# Patient Record
Sex: FEMALE | Race: BLACK OR AFRICAN AMERICAN | NOT HISPANIC OR LATINO | Employment: PART TIME | URBAN - METROPOLITAN AREA
[De-identification: names, ages, dates, MRNs, and addresses within clinical notes are randomized per-mention and may not be internally consistent; named-entity substitution may affect disease eponyms.]

---

## 2017-02-20 ENCOUNTER — SEE NOTE (OUTPATIENT)
Dept: URBAN - METROPOLITAN AREA CLINIC 36 | Facility: CLINIC | Age: 32
Setting detail: DERMATOLOGY
End: 2017-02-20

## 2017-02-20 PROCEDURE — 99213 OFFICE O/P EST LOW 20 MIN: CPT

## 2017-02-20 PROCEDURE — 87101 SKIN FUNGI CULTURE: CPT

## 2017-02-20 RX ORDER — HYDROQUINONE 40 MG/G
1 APPLICATION CREAM TOPICAL BID
Qty: 46 | Refills: 6 | Status: DISCONTINUED
Start: 2017-02-20 | End: 2018-02-05

## 2017-02-20 RX ORDER — FLUOCINOLONE ACETONIDE 0.11 MG/ML
1 ML OIL TOPICAL NIGHTLY
Qty: 118.28 | Refills: 3 | Status: DISCONTINUED
Start: 2017-02-20 | End: 2018-12-17

## 2017-02-20 RX ORDER — DAPSONE 75 MG/G
GEL TOPICAL
Qty: 60 | Refills: 6 | Status: DISCONTINUED
Start: 2017-02-20 | End: 2017-11-06

## 2017-02-21 ENCOUNTER — RX ONLY (RX ONLY)
Age: 32
End: 2017-02-21

## 2017-02-21 RX ORDER — AZELAIC ACID 0.15 G/G
1 APPLICATION GEL TOPICAL DAILY
Qty: 50 | Refills: 0 | Status: DISCONTINUED
Start: 2017-02-21 | End: 2018-04-09

## 2017-02-22 ENCOUNTER — RX ONLY (RX ONLY)
Age: 32
End: 2017-02-22

## 2017-02-22 RX ORDER — AZELAIC ACID 0.15 G/G
1 GM AEROSOL, FOAM TOPICAL DAILY
Qty: 50 | Refills: 0 | Status: DISCONTINUED
Start: 2017-02-22 | End: 2018-12-17

## 2017-11-06 ENCOUNTER — ACNE/ROSACEA (OUTPATIENT)
Dept: URBAN - METROPOLITAN AREA CLINIC 36 | Facility: CLINIC | Age: 32
Setting detail: DERMATOLOGY
End: 2017-11-06

## 2017-11-06 PROCEDURE — 99213 OFFICE O/P EST LOW 20 MIN: CPT

## 2017-11-06 RX ORDER — SPIRONOLACTONE 50 MG/1
1 TABLET TABLET, FILM COATED ORAL TID
Qty: 90 | Refills: 2 | Status: DISCONTINUED
Start: 2017-11-06 | End: 2018-12-17

## 2017-11-06 RX ORDER — AZELAIC ACID 0.15 G/G
1 GM AEROSOL, FOAM TOPICAL DAILY
Qty: 50 | Refills: 5 | Status: DISCONTINUED
Start: 2017-11-06 | End: 2018-04-09

## 2017-11-06 RX ORDER — DAPSONE 75 MG/G
1 GM GEL TOPICAL DAILY
Qty: 60 | Refills: 6 | Status: DISCONTINUED
Start: 2017-11-06 | End: 2018-12-17

## 2018-02-05 ENCOUNTER — FOLLOW UP (OUTPATIENT)
Dept: URBAN - METROPOLITAN AREA CLINIC 36 | Facility: CLINIC | Age: 33
Setting detail: DERMATOLOGY
End: 2018-02-05

## 2018-02-05 PROCEDURE — 99213 OFFICE O/P EST LOW 20 MIN: CPT

## 2018-04-09 ENCOUNTER — FOLLOW UP (OUTPATIENT)
Dept: URBAN - METROPOLITAN AREA CLINIC 36 | Facility: CLINIC | Age: 33
Setting detail: DERMATOLOGY
End: 2018-04-09

## 2018-04-09 PROCEDURE — 99213 OFFICE O/P EST LOW 20 MIN: CPT

## 2018-12-06 ENCOUNTER — RX ONLY (RX ONLY)
Age: 33
End: 2018-12-06

## 2018-12-06 RX ORDER — SPIRONOLACTONE 50 MG/1
ADD'L SIG ADD'L SIG TABLET, FILM COATED ORAL ADD'L SIG
Qty: 90 | Refills: 2 | Status: DISCONTINUED
Start: 2018-12-06 | End: 2018-12-17

## 2018-12-17 ENCOUNTER — ACNE/ROSACEA (OUTPATIENT)
Dept: URBAN - METROPOLITAN AREA CLINIC 36 | Facility: CLINIC | Age: 33
Setting detail: DERMATOLOGY
End: 2018-12-17

## 2018-12-17 PROCEDURE — 99213 OFFICE O/P EST LOW 20 MIN: CPT

## 2018-12-17 RX ORDER — DAPSONE 75 MG/G
1 GM GEL TOPICAL DAILY
Qty: 60 | Refills: 6 | Status: DISCONTINUED
Start: 2018-12-17 | End: 2019-12-27

## 2018-12-17 RX ORDER — SPIRONOLACTONE 50 MG/1
ADD'L SIG ADD'L SIG TABLET, FILM COATED ORAL ADD'L SIG
Qty: 90 | Refills: 2 | Status: DISCONTINUED
Start: 2018-12-17 | End: 2019-06-26

## 2018-12-17 RX ORDER — CLOBETASOL PROPIONATE 0.5 MG/G
1 APPLICATION AEROSOL, FOAM TOPICAL DAILY
Qty: 100 | Refills: 5 | Status: DISCONTINUED
Start: 2018-12-17 | End: 2019-12-27

## 2018-12-17 RX ORDER — CICLOPIROX 1 G/100ML
1 APPLICATION SHAMPOO TOPICAL 2 TIMES WEEKLY
Qty: 120 | Refills: 6 | Status: DISCONTINUED
Start: 2018-12-17 | End: 2019-12-27

## 2019-03-25 ENCOUNTER — ACNE/ROSACEA (OUTPATIENT)
Dept: URBAN - METROPOLITAN AREA CLINIC 36 | Facility: CLINIC | Age: 34
Setting detail: DERMATOLOGY
End: 2019-03-25

## 2019-03-25 DIAGNOSIS — D48.5 NEOPLASM OF UNCERTAIN BEHAVIOR OF SKIN: ICD-10-CM

## 2019-03-25 PROCEDURE — 99213 OFFICE O/P EST LOW 20 MIN: CPT

## 2019-06-26 ENCOUNTER — RX ONLY (RX ONLY)
Age: 34
End: 2019-06-26

## 2019-06-26 RX ORDER — SPIRONOLACTONE 50 MG/1
ADD'L SIG ADD'L SIG TABLET, FILM COATED ORAL ADD'L SIG
Qty: 90 | Refills: 2
Start: 2019-06-26

## 2019-12-27 ENCOUNTER — ACNE/ROSACEA (OUTPATIENT)
Dept: URBAN - METROPOLITAN AREA CLINIC 36 | Facility: CLINIC | Age: 34
Setting detail: DERMATOLOGY
End: 2019-12-27

## 2019-12-27 DIAGNOSIS — Z48.02 ENCOUNTER FOR REMOVAL OF SUTURES: ICD-10-CM

## 2019-12-27 PROCEDURE — 99213 OFFICE O/P EST LOW 20 MIN: CPT

## 2020-06-19 ENCOUNTER — SEE NOTE (OUTPATIENT)
Dept: URBAN - METROPOLITAN AREA CLINIC 36 | Facility: CLINIC | Age: 35
Setting detail: DERMATOLOGY
End: 2020-06-19

## 2020-06-19 DIAGNOSIS — B07.8 OTHER VIRAL WARTS: ICD-10-CM

## 2020-06-19 PROCEDURE — 99213 OFFICE O/P EST LOW 20 MIN: CPT

## 2020-06-22 ENCOUNTER — RX ONLY (RX ONLY)
Age: 35
End: 2020-06-22

## 2020-06-22 RX ORDER — CLINDAMYCIN PHOSPHATE AND TRETINOIN 12; .25 MG/G; MG/G
1 APPLICATION GEL TOPICAL NIGHTLY
Qty: 30 | Refills: 2
Start: 2020-06-22

## 2020-06-22 RX ORDER — DOXYCYCLINE HYCLATE 100 MG/1
1 CAPSULE CAPSULE, GELATIN COATED ORAL DAILY
Qty: 30 | Refills: 1
Start: 2020-06-22

## 2020-06-22 RX ORDER — AZELAIC ACID 0.15 G/G
1 APPLICATION GEL TOPICAL QHS
Qty: 50 | Refills: 3
Start: 2020-06-22

## 2020-06-22 RX ORDER — NYSTATIN AND TRIAMCINOLONE ACETONIDE 100000; 1 [USP'U]/G; MG/G
1 APPLICATION CREAM TOPICAL BID
Qty: 60 | Refills: 0
Start: 2020-06-22

## 2021-02-05 ENCOUNTER — APPOINTMENT (RX ONLY)
Dept: URBAN - METROPOLITAN AREA OTHER 5 | Facility: OTHER | Age: 36
Setting detail: DERMATOLOGY
End: 2021-02-05

## 2021-02-05 DIAGNOSIS — L81.0 POSTINFLAMMATORY HYPERPIGMENTATION: ICD-10-CM

## 2021-02-05 DIAGNOSIS — L70.0 ACNE VULGARIS: ICD-10-CM | Status: INADEQUATELY CONTROLLED

## 2021-02-05 PROCEDURE — 99204 OFFICE O/P NEW MOD 45 MIN: CPT

## 2021-02-05 PROCEDURE — ? TREATMENT REGIMEN

## 2021-02-05 PROCEDURE — ? ORDER TESTS

## 2021-02-05 PROCEDURE — ? COUNSELING

## 2021-02-05 ASSESSMENT — LOCATION SIMPLE DESCRIPTION DERM
LOCATION SIMPLE: RIGHT CHEEK
LOCATION SIMPLE: INFERIOR FOREHEAD

## 2021-02-05 ASSESSMENT — LOCATION DETAILED DESCRIPTION DERM
LOCATION DETAILED: INFERIOR MID FOREHEAD
LOCATION DETAILED: RIGHT INFERIOR CENTRAL MALAR CHEEK

## 2021-02-05 ASSESSMENT — LOCATION ZONE DERM: LOCATION ZONE: FACE

## 2021-02-05 NOTE — PROCEDURE: TREATMENT REGIMEN
Continue Regimen: Veltin at night
Initiate Treatment: Ponds for sensitive skin
Detail Level: Simple
Otc Regimen: Vitex berry, niacinamide, zinc\\nThe ordinary azelaic acid suspension\\nEminence probiotic face wash
Initiate Treatment: Skinceuticals Discoloration defense \\nISDIN pigment expert ampules \\nSkinmedica lytera \\n\\Larisa of these in place of Hydroquinone
Discontinue Regimen: Stop hydroquinone for now

## 2021-02-05 NOTE — PROCEDURE: COUNSELING
High Dose Vitamin A Counseling: Side effects reviewed, pt to contact office should one occur.
Benzoyl Peroxide Counseling: Patient counseled that medicine may cause skin irritation and bleach clothing.  In the event of skin irritation, the patient was advised to reduce the amount of the drug applied or use it less frequently.   The patient verbalized understanding of the proper use and possible adverse effects of benzoyl peroxide.  All of the patient's questions and concerns were addressed.
Dapsone Counseling: I discussed with the patient the risks of dapsone including but not limited to hemolytic anemia, agranulocytosis, rashes, methemoglobinemia, kidney failure, peripheral neuropathy, headaches, GI upset, and liver toxicity.  Patients who start dapsone require monitoring including baseline LFTs and weekly CBCs for the first month, then every month thereafter.  The patient verbalized understanding of the proper use and possible adverse effects of dapsone.  All of the patient's questions and concerns were addressed.
Erythromycin Counseling:  I discussed with the patient the risks of erythromycin including but not limited to GI upset, allergic reaction, drug rash, diarrhea, increase in liver enzymes, and yeast infections.
Tazorac Counseling:  Patient advised that medication is irritating and drying.  Patient may need to apply sparingly and wash off after an hour before eventually leaving it on overnight.  The patient verbalized understanding of the proper use and possible adverse effects of tazorac.  All of the patient's questions and concerns were addressed.
Bactrim Pregnancy And Lactation Text: This medication is Pregnancy Category D and is known to cause fetal risk.  It is also excreted in breast milk.
Spironolactone Pregnancy And Lactation Text: This medication can cause feminization of the male fetus and should be avoided during pregnancy. The active metabolite is also found in breast milk.
Dapsone Pregnancy And Lactation Text: This medication is Pregnancy Category C and is not considered safe during pregnancy or breast feeding.
Minocycline Pregnancy And Lactation Text: This medication is Pregnancy Category D and not consider safe during pregnancy. It is also excreted in breast milk.
Benzoyl Peroxide Pregnancy And Lactation Text: This medication is Pregnancy Category C. It is unknown if benzoyl peroxide is excreted in breast milk.
Tazorac Pregnancy And Lactation Text: This medication is not safe during pregnancy. It is unknown if this medication is excreted in breast milk.
Erythromycin Pregnancy And Lactation Text: This medication is Pregnancy Category B and is considered safe during pregnancy. It is also excreted in breast milk.
Topical Sulfur Applications Counseling: Topical Sulfur Counseling: Patient counseled that this medication may cause skin irritation or allergic reactions.  In the event of skin irritation, the patient was advised to reduce the amount of the drug applied or use it less frequently.   The patient verbalized understanding of the proper use and possible adverse effects of topical sulfur application.  All of the patient's questions and concerns were addressed.
Azithromycin Counseling:  I discussed with the patient the risks of azithromycin including but not limited to GI upset, allergic reaction, drug rash, diarrhea, and yeast infections.
Detail Level: Zone
Sarecycline Counseling: Patient advised regarding possible photosensitivity and discoloration of the teeth, skin, lips, tongue and gums.  Patient instructed to avoid sunlight, if possible.  When exposed to sunlight, patients should wear protective clothing, sunglasses, and sunscreen.  The patient was instructed to call the office immediately if the following severe adverse effects occur:  hearing changes, easy bruising/bleeding, severe headache, or vision changes.  The patient verbalized understanding of the proper use and possible adverse effects of sarecycline.  All of the patient's questions and concerns were addressed.
Birth Control Pills Counseling: Birth Control Pill Counseling: I discussed with the patient the potential side effects of OCPs including but not limited to increased risk of stroke, heart attack, thrombophlebitis, deep venous thrombosis, hepatic adenomas, breast changes, GI upset, headaches, and depression.  The patient verbalized understanding of the proper use and possible adverse effects of OCPs. All of the patient's questions and concerns were addressed.
Azithromycin Pregnancy And Lactation Text: This medication is considered safe during pregnancy and is also secreted in breast milk.
Use Enhanced Medication Counseling?: No
Tetracycline Counseling: Patient counseled regarding possible photosensitivity and increased risk for sunburn.  Patient instructed to avoid sunlight, if possible.  When exposed to sunlight, patients should wear protective clothing, sunglasses, and sunscreen.  The patient was instructed to call the office immediately if the following severe adverse effects occur:  hearing changes, easy bruising/bleeding, severe headache, or vision changes.  The patient verbalized understanding of the proper use and possible adverse effects of tetracycline.  All of the patient's questions and concerns were addressed. Patient understands to avoid pregnancy while on therapy due to potential birth defects.
Doxycycline Counseling:  Patient counseled regarding possible photosensitivity and increased risk for sunburn.  Patient instructed to avoid sunlight, if possible.  When exposed to sunlight, patients should wear protective clothing, sunglasses, and sunscreen.  The patient was instructed to call the office immediately if the following severe adverse effects occur:  hearing changes, easy bruising/bleeding, severe headache, or vision changes.  The patient verbalized understanding of the proper use and possible adverse effects of doxycycline.  All of the patient's questions and concerns were addressed.
Topical Retinoid counseling:  Patient advised to apply a pea-sized amount only at bedtime and wait 30 minutes after washing their face before applying.  If too drying, patient may add a non-comedogenic moisturizer. The patient verbalized understanding of the proper use and possible adverse effects of retinoids.  All of the patient's questions and concerns were addressed.
Isotretinoin Counseling: Patient should get monthly blood tests, not donate blood, not drive at night if vision affected, not share medication, and not undergo elective surgery for 6 months after tx completed. Side effects reviewed, pt to contact office should one occur.
Birth Control Pills Pregnancy And Lactation Text: This medication should be avoided if pregnant and for the first 30 days post-partum.
Doxycycline Pregnancy And Lactation Text: This medication is Pregnancy Category D and not consider safe during pregnancy. It is also excreted in breast milk but is considered safe for shorter treatment courses.
Topical Clindamycin Counseling: Patient counseled that this medication may cause skin irritation or allergic reactions.  In the event of skin irritation, the patient was advised to reduce the amount of the drug applied or use it less frequently.   The patient verbalized understanding of the proper use and possible adverse effects of clindamycin.  All of the patient's questions and concerns were addressed.
High Dose Vitamin A Pregnancy And Lactation Text: High dose vitamin A therapy is contraindicated during pregnancy and breast feeding.
Topical Sulfur Applications Pregnancy And Lactation Text: This medication is Pregnancy Category C and has an unknown safety profile during pregnancy. It is unknown if this topical medication is excreted in breast milk.
Topical Retinoid Pregnancy And Lactation Text: This medication is Pregnancy Category C. It is unknown if this medication is excreted in breast milk.
Isotretinoin Pregnancy And Lactation Text: This medication is Pregnancy Category X and is considered extremely dangerous during pregnancy. It is unknown if it is excreted in breast milk.
Topical Clindamycin Pregnancy And Lactation Text: This medication is Pregnancy Category B and is considered safe during pregnancy. It is unknown if it is excreted in breast milk.
Minocycline Counseling: Patient advised regarding possible photosensitivity and discoloration of the teeth, skin, lips, tongue and gums.  Patient instructed to avoid sunlight, if possible.  When exposed to sunlight, patients should wear protective clothing, sunglasses, and sunscreen.  The patient was instructed to call the office immediately if the following severe adverse effects occur:  hearing changes, easy bruising/bleeding, severe headache, or vision changes.  The patient verbalized understanding of the proper use and possible adverse effects of minocycline.  All of the patient's questions and concerns were addressed.
Spironolactone Counseling: Patient advised regarding risks of diarrhea, abdominal pain, hyperkalemia, birth defects (for female patients), liver toxicity and renal toxicity. The patient may need blood work to monitor liver and kidney function and potassium levels while on therapy. The patient verbalized understanding of the proper use and possible adverse effects of spironolactone.  All of the patient's questions and concerns were addressed.
Bactrim Counseling:  I discussed with the patient the risks of sulfa antibiotics including but not limited to GI upset, allergic reaction, drug rash, diarrhea, dizziness, photosensitivity, and yeast infections.  Rarely, more serious reactions can occur including but not limited to aplastic anemia, agranulocytosis, methemoglobinemia, blood dyscrasias, liver or kidney failure, lung infiltrates or desquamative/blistering drug rashes.

## 2021-02-05 NOTE — HPI: PIMPLES (ACNE)
What Type Of Note Output Would You Prefer (Optional)?: Bullet Format
How Severe Is Your Acne?: mild
Is This A New Presentation, Or A Follow-Up?: Acne
Additional Comments (Use Complete Sentences): Currently using veltin, SulfaCleanse, and azeleic acid. Hydroquinone for post inflammatory hyperpigmentation.

## 2021-02-05 NOTE — PROCEDURE: ORDER TESTS
Performing Laboratory: [id number]
Bill For Surgical Tray: no
Expected Date Of Service: 02/05/2021
Billing Type: Third-Party Bill

## 2021-03-16 ENCOUNTER — RX ONLY (OUTPATIENT)
Age: 36
Setting detail: RX ONLY
End: 2021-03-16

## 2021-03-16 RX ORDER — CLINDAMYCIN PHOSPHATE 10 MG/G
GEL TOPICAL
Qty: 1 | Refills: 3 | Status: ERX | COMMUNITY
Start: 2021-03-16